# Patient Record
Sex: FEMALE | Race: OTHER | HISPANIC OR LATINO
[De-identification: names, ages, dates, MRNs, and addresses within clinical notes are randomized per-mention and may not be internally consistent; named-entity substitution may affect disease eponyms.]

---

## 2017-09-15 PROBLEM — Z00.00 ENCOUNTER FOR PREVENTIVE HEALTH EXAMINATION: Status: ACTIVE | Noted: 2017-09-15

## 2017-09-18 ENCOUNTER — APPOINTMENT (OUTPATIENT)
Dept: ANTEPARTUM | Facility: CLINIC | Age: 52
End: 2017-09-18
Payer: COMMERCIAL

## 2017-09-18 ENCOUNTER — ASOB RESULT (OUTPATIENT)
Age: 52
End: 2017-09-18

## 2017-09-18 PROCEDURE — 76830 TRANSVAGINAL US NON-OB: CPT

## 2017-09-18 PROCEDURE — 76857 US EXAM PELVIC LIMITED: CPT

## 2018-09-21 ENCOUNTER — ASOB RESULT (OUTPATIENT)
Age: 53
End: 2018-09-21

## 2018-09-21 ENCOUNTER — APPOINTMENT (OUTPATIENT)
Dept: ANTEPARTUM | Facility: CLINIC | Age: 53
End: 2018-09-21
Payer: COMMERCIAL

## 2018-09-21 PROCEDURE — 76830 TRANSVAGINAL US NON-OB: CPT

## 2018-09-21 PROCEDURE — 76857 US EXAM PELVIC LIMITED: CPT

## 2019-08-23 ENCOUNTER — APPOINTMENT (OUTPATIENT)
Dept: NEUROLOGY | Facility: CLINIC | Age: 54
End: 2019-08-23
Payer: COMMERCIAL

## 2019-08-23 VITALS
WEIGHT: 136 LBS | DIASTOLIC BLOOD PRESSURE: 80 MMHG | HEIGHT: 63 IN | BODY MASS INDEX: 24.1 KG/M2 | SYSTOLIC BLOOD PRESSURE: 122 MMHG

## 2019-08-23 DIAGNOSIS — H83.2X9 LABYRINTHINE DYSFUNCTION, UNSPECIFIED EAR: ICD-10-CM

## 2019-08-23 DIAGNOSIS — R42 DIZZINESS AND GIDDINESS: ICD-10-CM

## 2019-08-23 DIAGNOSIS — Z86.79 PERSONAL HISTORY OF OTHER DISEASES OF THE CIRCULATORY SYSTEM: ICD-10-CM

## 2019-08-23 PROCEDURE — 99204 OFFICE O/P NEW MOD 45 MIN: CPT

## 2019-08-23 RX ORDER — AMLODIPINE BESYLATE 2.5 MG/1
2.5 TABLET ORAL
Refills: 0 | Status: ACTIVE | COMMUNITY

## 2019-08-23 NOTE — REASON FOR VISIT
[Consultation] : a consultation visit [Family Member] : family member [FreeTextEntry1] : Chief complaint: Dizziness, Dr. Rosario

## 2019-08-23 NOTE — CONSULT LETTER
[Consult Letter:] : I had the pleasure of evaluating your patient, [unfilled]. [Dear  ___] : Dear  [unfilled], [Consult Closing:] : Thank you very much for allowing me to participate in the care of this patient.  If you have any questions, please do not hesitate to contact me. [Please see my note below.] : Please see my note below. [Sincerely,] : Sincerely, [FreeTextEntry3] : Jozef Marques MD, PhD, DPN-N\par [facility]\par Neurology at [place]\par Chief, Division of Neurology\par [facility]\par

## 2019-08-23 NOTE — HISTORY OF PRESENT ILLNESS
[FreeTextEntry1] : She is here with her daughter who helps interpret although the patient speaks and understands English. 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 This first happened 5 years ago. She spent 2 days in the hospital. She had a negative brain MRI. She has worked on other machines without a problem. It is only when exposed to this one machine that she becomes dizzy. She has no dizziness at other times outside of the work environment. Denies any tinnitus or hearing loss. Denies any focal weakness or numbness.\par \par Medical history otherwise significant for hypertension.

## 2019-08-23 NOTE — PHYSICAL EXAM
[General Appearance - In No Acute Distress] : in no acute distress [General Appearance - Well-Appearing] : healthy appearing [General Appearance - Alert] : alert [Impaired Insight] : insight and judgment were intact [Oriented To Time, Place, And Person] : oriented to person, place, and time [Affect] : the affect was normal [Memory Recent] : recent memory was not impaired [Person] : oriented to person [Place] : oriented to place [Time] : oriented to time [Concentration Intact] : normal concentrating ability [Fluency] : fluency intact [Comprehension] : comprehension intact [Cranial Nerves Optic (II)] : visual acuity intact bilaterally,  visual fields full to confrontation, pupils equal round and reactive to light [Cranial Nerves Facial (VII)] : face symmetrical [Cranial Nerves Trigeminal (V)] : facial sensation intact symmetrically [Cranial Nerves Oculomotor (III)] : extraocular motion intact [Cranial Nerves Glossopharyngeal (IX)] : tongue and palate midline [Cranial Nerves Vestibulocochlear (VIII)] : hearing was intact bilaterally [Cranial Nerves Accessory (XI - Cranial And Spinal)] : head turning and shoulder shrug symmetric [Cranial Nerves Hypoglossal (XII)] : there was no tongue deviation with protrusion [Motor Tone] : muscle tone was normal in all four extremities [Motor Strength] : muscle strength was normal in all four extremities [No Muscle Atrophy] : normal bulk in all four extremities [Sensation Tactile Decrease] : light touch was intact [Sensation Pain / Temperature Decrease] : pain and temperature was intact [Abnormal Walk] : normal gait [Proprioception] : proprioception was intact [Balance] : balance was intact [2+] : Ankle jerk left 2+ [Edema] : there was no peripheral edema [FreeTextEntry1] : Hallpike maneuver negative. [Paresis Pronator Drift Right-Sided] : no pronator drift on the right [Past-pointing] : there was no past-pointing [Paresis Pronator Drift Left-Sided] : no pronator drift on the left [Romberg's Sign] : Romberg's sign was negtive [Tremor] : no tremor present [Coordination - Dysmetria Impaired Finger-to-Nose Bilateral] : not present [Plantar Reflex Right Only] : normal on the right [Coordination - Dysmetria Impaired Heel-to-Shin Bilateral] : not present [Plantar Reflex Left Only] : normal on the left [FreeTextEntry5] : No nystagmus.

## 2019-08-23 NOTE — ASSESSMENT
[FreeTextEntry1] : Her neurological examination is entirely normal. This sounds like a labyrinthine disturbance. She has had a negative brain MRI in the past. I believe she could continue to work as long as she is not working on this rotating machine that causes of vertigo.

## 2020-11-30 ENCOUNTER — EMERGENCY (EMERGENCY)
Facility: HOSPITAL | Age: 55
LOS: 1 days | Discharge: HOME | End: 2020-11-30
Attending: EMERGENCY MEDICINE
Payer: COMMERCIAL

## 2020-11-30 VITALS
WEIGHT: 130.07 LBS | SYSTOLIC BLOOD PRESSURE: 155 MMHG | RESPIRATION RATE: 18 BRPM | HEIGHT: 60 IN | OXYGEN SATURATION: 99 % | TEMPERATURE: 98 F | HEART RATE: 92 BPM | DIASTOLIC BLOOD PRESSURE: 88 MMHG

## 2020-11-30 PROCEDURE — 99283 EMERGENCY DEPT VISIT LOW MDM: CPT

## 2020-11-30 PROCEDURE — 99282 EMERGENCY DEPT VISIT SF MDM: CPT

## 2020-11-30 PROCEDURE — T1013: CPT

## 2020-11-30 NOTE — ED PROVIDER NOTE - NSFOLLOWUPINSTRUCTIONS_ED_ALL_ED_FT
Hipertensión crónica    LO QUE NECESITA SABER:    ¿Qué es la hipertensión?La hipertensión es la presión arterial shari. La presión arterial es la fuerza que ejerce la oliver al moverse contra las masterson de las arterias. La hipertensión causa que august presión arterial se eleve tanto que august corazón se ve forzado a trabajar mucho más evelin que lo normal. [place] puede dañar august corazón. Incluso si tiene hipertensión kennedi años, los cambios de estilo de rao, medicamentos o ambos pueden ayudar a bajar la presión arterial a niveles normales.    ¿Qué necesito saber sobre las etapas de la hipertensión?    Lecturas de la presión arterial     •Verito presión arterial normal es 119/79 o inferior. August médico podría comprobar august presión arterial solamente cada año si se mantiene en un nivel normal.      •Verito presión arterial elevada es de 120/79 a 129/79. A veces esto se llama prehipertensión. August médico puede sugerir cambios de estilo de rao para ayudar a bajar la presión arterial a un nivel normal. Entonces él podría comprobar august presión otra vez en 3 a 6 meses.      •La etapa 1 de la hipertensión es de 130/80 a 139/89. August médico podría recomendar cambios de estilo de rao, medicamentos y controles cada 3 a 6 meses hasta que august presión arterial esté controlada.      •La etapa 2 de la hipertensión es de 140/90 o mayor. August médico le recomendará cambios en el estilo de rao y le indicará 2 clases de medicamentos para la hipertensión. También necesitará controlar august presión arterial cada mes hasta que esté controlada.      ¿Qué cambios puede hacer mi médico en los medicamentos utilizados para tratar la hipertensión crónica?August médico puede agregar, quitar o cambiar cualquiera de los siguientes medicamentos. No cambie ni deje de meryl ninguno de chano medicamentos actuales sin consultar a august médico.   •Los antihipertensivospodrían usarse para ayudar a disminuir la presión arterial. Varios tipos de medicamentos están disponibles. August médico podría modificar los medicamentos que vladimir. [place] puede ser necesario si august presión arterial suele ser shari cuando usted la controla en august casa o tiene otros problemas con el control de la presión arterial.      •Los diuréticosayudan a eliminar el exceso de líquido que se acumula en el organismo. [place] puede ayudar a bajar august presión arterial. Es posible que orine más seguido mientras vladimir addie medicamento.      •Los medicamentos para el colesterolayudan a bajar los niveles de colesterol. Un nivel bajo de colesterol ayuda a prevenir enfermedades cardíacas y facilita el control de la presión arterial.      ¿Qué puedo hacer para controlar la hipertensión crónica?  •Controle august presión arterial en casa.Evite fumar, consumir cafeína y hacer ejercicio al menos 30 minutos antes de controlar august presión arterial. Siéntese y descanse por 5 minutos antes de tomarse la presión arterial. Extienda august brazo y apóyelo en verito superficie plana. August brazo debe estar a la misma altura que august corazón. Siga las instrucciones que vienen con el monitor para la presión arterial. Controle august presión arterial 2 veces, con diferencia de 1 minuto, antes de meryl august medicamento por la mañana. También controle august presión arterial antes de la john. Mantenga un registro de august peso y llévelo con usted a las citas de control. Pregúntele a august médico cuál debería ser august presión arterial.   Cómo meryl la presión arterial           •Controle cualquier otra condición médica que usted tenga.Algunas condiciones médicas barrett la diabetes pueden aumentar august riesgo de hipertensión. Siga las instrucciones de august médico y tómese chano medicamentos según dichas instrucciones. Hable con august médico sobre cualquier nueva afección médica que haya desarrollado recientemente.      •Pregunte sobre los medicamentos.Ciertos medicamentos pueden aumentar august presión arterial. Los ejemplos incluyen las píldoras anticonceptivas orales, los descongestivos, los suplementos herbales y los LUIZ, barrett el ibuprofeno. August médico puede indicarle qué medicamentos son seguros para usted. Estos medicamentos incluyen los recetados y de venta kaya.      ¿Qué cambios de estilo de rao puedo realizar para reducir mi presión arterial?August médico rosalina vez quiera que mario más cambios de estilo de rao si usted tiene problemas para controlar august presión arterial. [place] puede parecer difícil con el tiempo, especialmente si usted piensa que usted está haciendo buenos cambios moses august presión sigue siendo shari. Rosalina vez lo ayude centrarse en un nuevo cambio a la vez. Por ejemplo, intente agregar 1 día más de ejercicio o ejercite kennedi 10 minutos adicionales en 2 días. Pequeños cambios pueden hacer verito gran diferencia. August médico también puede derivarlo a los especialistas, barrett un dietista que pueda ayudarlo a hacer pequeños cambios.  •Limite el sodio (la sal) barrett se le haya indicado.Demasiado sodio puede afectar el equilibrio de líquidos. Revise las etiquetas para buscar alimentos bajos en sodio o sin sal agregada. Algunos alimentos bajos en sodio utilizan sales de potasio para añadir sabor. Demasiado potasio también puede causar problemas de michael. August médico le dirá qué cantidad de sodio y potasio es lobo para el consumo en un día. Él puede recomendarle que limite el sodio a 2,300 mg al día.             •Siga el plan de comidas recomendado por august médico.Un dietista o médico puede darle más información sobre planes de bajo contenido de sodio o el plan de alimentación DASH (enfoques dietéticos para detener la hipertensión). El plan DASH es bajo en sodio, grasas saturadas y grasa total. Es alto en potasio, calcio y fibra.              •Ejercítese para mantener un peso saludable.Realice actividad física por lo menos 30 minutos al día, la mayoría de los días de la semana. [place] ayudará a bajar august presión arterial. Pregunte a august médico acerca del mejor plan de ejercicio para usted.   Caminar para ejercitarse           •Disminuya el estrés.Es posible que esto contribuya a bajar august presión arterial. Aprenda sobre formas de relajarse, barrett respiración profunda o escuchar música.      •Limite el consumo de alcohol según le indicaron.El alcohol puede aumentar la presión arterial. Un trago equivale a 12 onzas de cerveza, 5 onzas de vino o 1 onza y ½ de licor.      •No fume.La nicotina y otros químicos en los cigarrillos y cigarros pueden aumentar august presión arterial y también provocar daño al pulmón. Pida información a august médico si usted actualmente fuma y necesita ayuda para dejar de fumar. Los cigarrillos electrónicos o el tabaco sin humo igualmente contienen nicotina. Consulte con august médico antes de utilizar estos productos.   Evite la enfermedad cardíaca           Llame al 911 en rebecca de presentar lo siguiente:  •Usted tiene dolor en el pecho.      •Tiene alguno de los siguientes signos de un ataque cardíaco: ?Estrujamiento, presión o tensión en august pecho      ?Usted también podría presentar alguno de los siguientes: ?Malestar o dolor en august espalda, floyd, mandíbula, abdomen, o brazo      ?Falta de aliento      ?Náuseas o vómitos      ?Desvanecimiento o sudor frío repentino        •Usted se siente confundido o tiene dificultad para hablar.      •Repentinamente se siente aturdido o con dificultad para respirar.      ¿Cuándo virgen buscar atención inmediata?  •Usted tiene un evelin dolor de sarah beth o pérdida de la visión.      •Usted tiene debilidad en un brazo o en verito pierna.      ¿Cuándo virgen comunicarme con mi médico?  •Usted se siente mareado, confundido, somnoliento o barrett si se fuera a desmayar.      •Usted ha estado tomando medicamento para la presión arterial moses todavía está en un nivel más elevado del que august médico le indicó que debería estar.      •Usted tiene preguntas o inquietudes acerca de august condición o cuidado.      ACUERDOS SOBRE AUGUST CUIDADO:    Usted tiene el derecho de ayudar a planear august cuidado. Aprenda todo lo que pueda sobre august condición y barrett darle tratamiento. Discuta chano opciones de tratamiento con chano médicos para decidir el cuidado que usted desea recibir. Usted siempre tiene el derecho de rechazar el tratamiento.

## 2020-11-30 NOTE — ED PROVIDER NOTE - NSFOLLOWUPCLINICS_GEN_ALL_ED_FT
[facility]  Cardiology  [address] [place] [address]  Phone: [phone]  Fax:   Follow Up Time:

## 2020-11-30 NOTE — ED PROVIDER NOTE - ATTENDING CONTRIBUTION TO CARE
I personally saw the patient with the PA, and completed the key components of the history and physical exam. I then discussed the management plan with the PA.   gen in nad resp clear cardiac no murmur abd soft neuro: CN II - XII intact, EOMI, PERRL, no papilledema, 5/5 muscle strength x 4 extremities, no sensory deficits, 2+ dtr globally, negative babinski, no ataxic gait, normal PENG and FNT, normal romberg   return to ed for intractable HA, persistent vomiting, or new onset motor/sensory deficits

## 2020-11-30 NOTE — ED PROVIDER NOTE - OBJECTIVE STATEMENT
PT with SPMHX of HTN (on norvasc 5mg QD), anxiety presents to the ED with complaint of elevated BP. Pt states that she had 2 incidents of elevated BP readings at home with her cuff that is placed on wrist. Pt states that she has had a intermit mild non radiating diffuse dull head achee that came on gradually that she would not be concerned about had she not had elevated BP reads at home. Pt states that her main complaint is BP reading and making sure it was okay.  Pt states that she has taken all meds today. Pt dines fever, chills, weakness, CP, SOB, diff breathing, change in vision, change in hearing.

## 2020-11-30 NOTE — ED PROVIDER NOTE - NS ED ROS FT
ROS: CONTUSIONAL: Denies fever, chills, fatigue, wt loss. HEAD: +HA, Denies trauma,  Dizziness. EYE: Denies Acute visual changes, diplopia. ENMT: Denies change in hearing, tinnitus, epistaxis, difficulty swallowing, sore throat. CARDIO: Denies CP, palpitations, edema. RESP: Denies Cough, SOB , Diff breathing, hemoptysis. GI: Denies N/V, ABD pain, change in bowel movement. URINARY: Denies difficulty urinating, pelvic pain. MS:  Denies joint pain, back pain, weakness, decreased ROM, swelling. NEURO: Denies change in gait, seizures, loss of sensation, dizziness, confusion LOC.  PSY: NO SI/HI.

## 2020-11-30 NOTE — ED PROVIDER NOTE - PATIENT PORTAL LINK FT
You can access the FollowMyHealth Patient Portal offered by [facility] by registering at the following website: [email or web address]. By joining [employer]s FollowMyHealth portal, you will also be able to view your health information using other applications (apps) compatible with our system.

## 2020-11-30 NOTE — ED PROVIDER NOTE - CLINICAL SUMMARY MEDICAL DECISION MAKING FREE TEXT BOX
PT with stable VS, no acute distress, non toxic appearing, tolerating PO in the ED, Pt informed of elevated BP and the need for further follow up to PCP for management has no CP, SOB, diff breathing, cough, ABD pain, dizziness, change in vision, back pain, HA. PT educated that at this time there is no indication for emergent management of BP, and the importance ot BP control over long term due to potential for long term complication from uncontrolled HTN. PT verbalizes that they understand all information given to them. educated about when to return to the ED if needed. PT verbalizes that he understands all instructions and results. Pt infomred that ED is open and availible 24/7 365 days a yr, encouraged to return to the ED if they have any change in condition, or feel the need for revaluation.    utilized to obtain History, ROS, Physical Exam, explanations of results and plan of care, as well as follow up instructions.

## 2021-02-03 ENCOUNTER — APPOINTMENT (OUTPATIENT)
Dept: ANTEPARTUM | Facility: CLINIC | Age: 56
End: 2021-02-03
Payer: MEDICAID

## 2021-02-03 ENCOUNTER — ASOB RESULT (OUTPATIENT)
Age: 56
End: 2021-02-03

## 2021-02-03 PROBLEM — F41.9 ANXIETY DISORDER, UNSPECIFIED: Chronic | Status: ACTIVE | Noted: 2020-11-30

## 2021-02-03 PROBLEM — I10 ESSENTIAL (PRIMARY) HYPERTENSION: Chronic | Status: ACTIVE | Noted: 2020-11-30

## 2021-02-03 PROCEDURE — 76857 US EXAM PELVIC LIMITED: CPT | Mod: 59

## 2021-02-03 PROCEDURE — 76830 TRANSVAGINAL US NON-OB: CPT

## 2021-02-03 PROCEDURE — 99072 ADDL SUPL MATRL&STAF TM PHE: CPT

## 2023-04-18 ENCOUNTER — APPOINTMENT (OUTPATIENT)
Dept: ANTEPARTUM | Facility: CLINIC | Age: 58
End: 2023-04-18
Payer: COMMERCIAL

## 2023-04-18 ENCOUNTER — ASOB RESULT (OUTPATIENT)
Age: 58
End: 2023-04-18

## 2023-04-18 PROCEDURE — 76830 TRANSVAGINAL US NON-OB: CPT

## 2023-04-18 PROCEDURE — 76857 US EXAM PELVIC LIMITED: CPT | Mod: 59
